# Patient Record
Sex: MALE | Race: WHITE | NOT HISPANIC OR LATINO | URBAN - METROPOLITAN AREA
[De-identification: names, ages, dates, MRNs, and addresses within clinical notes are randomized per-mention and may not be internally consistent; named-entity substitution may affect disease eponyms.]

---

## 2017-01-23 ENCOUNTER — OUTPATIENT (OUTPATIENT)
Dept: OUTPATIENT SERVICES | Facility: HOSPITAL | Age: 52
LOS: 1 days | Discharge: HOME | End: 2017-01-23

## 2017-06-27 DIAGNOSIS — E78.00 PURE HYPERCHOLESTEROLEMIA, UNSPECIFIED: ICD-10-CM

## 2017-06-27 DIAGNOSIS — J02.9 ACUTE PHARYNGITIS, UNSPECIFIED: ICD-10-CM

## 2017-06-27 DIAGNOSIS — G43.909 MIGRAINE, UNSPECIFIED, NOT INTRACTABLE, WITHOUT STATUS MIGRAINOSUS: ICD-10-CM

## 2018-06-22 ENCOUNTER — OUTPATIENT (OUTPATIENT)
Dept: OUTPATIENT SERVICES | Facility: HOSPITAL | Age: 53
LOS: 1 days | Discharge: HOME | End: 2018-06-22

## 2018-06-22 DIAGNOSIS — E78.00 PURE HYPERCHOLESTEROLEMIA, UNSPECIFIED: ICD-10-CM

## 2018-11-06 ENCOUNTER — OUTPATIENT (OUTPATIENT)
Dept: OUTPATIENT SERVICES | Facility: HOSPITAL | Age: 53
LOS: 1 days | Discharge: HOME | End: 2018-11-06

## 2018-11-06 DIAGNOSIS — E04.2 NONTOXIC MULTINODULAR GOITER: ICD-10-CM

## 2018-11-06 DIAGNOSIS — E78.00 PURE HYPERCHOLESTEROLEMIA, UNSPECIFIED: ICD-10-CM

## 2018-11-06 DIAGNOSIS — G43.909 MIGRAINE, UNSPECIFIED, NOT INTRACTABLE, WITHOUT STATUS MIGRAINOSUS: ICD-10-CM

## 2018-11-20 ENCOUNTER — OUTPATIENT (OUTPATIENT)
Dept: OUTPATIENT SERVICES | Facility: HOSPITAL | Age: 53
LOS: 1 days | Discharge: HOME | End: 2018-11-20

## 2018-11-20 DIAGNOSIS — G43.909 MIGRAINE, UNSPECIFIED, NOT INTRACTABLE, WITHOUT STATUS MIGRAINOSUS: ICD-10-CM

## 2018-11-20 DIAGNOSIS — E04.2 NONTOXIC MULTINODULAR GOITER: ICD-10-CM

## 2019-03-12 ENCOUNTER — OUTPATIENT (OUTPATIENT)
Dept: OUTPATIENT SERVICES | Facility: HOSPITAL | Age: 54
LOS: 1 days | Discharge: HOME | End: 2019-03-12

## 2019-03-12 DIAGNOSIS — H61.22 IMPACTED CERUMEN, LEFT EAR: ICD-10-CM

## 2019-03-12 DIAGNOSIS — E55.9 VITAMIN D DEFICIENCY, UNSPECIFIED: ICD-10-CM

## 2019-03-12 DIAGNOSIS — R20.2 PARESTHESIA OF SKIN: ICD-10-CM

## 2019-03-12 DIAGNOSIS — G43.909 MIGRAINE, UNSPECIFIED, NOT INTRACTABLE, WITHOUT STATUS MIGRAINOSUS: ICD-10-CM

## 2019-03-12 DIAGNOSIS — E78.00 PURE HYPERCHOLESTEROLEMIA, UNSPECIFIED: ICD-10-CM

## 2019-08-15 ENCOUNTER — OUTPATIENT (OUTPATIENT)
Dept: OUTPATIENT SERVICES | Facility: HOSPITAL | Age: 54
LOS: 1 days | Discharge: HOME | End: 2019-08-15

## 2019-08-15 DIAGNOSIS — R20.2 PARESTHESIA OF SKIN: ICD-10-CM

## 2019-08-15 DIAGNOSIS — E55.9 VITAMIN D DEFICIENCY, UNSPECIFIED: ICD-10-CM

## 2019-08-15 DIAGNOSIS — E78.00 PURE HYPERCHOLESTEROLEMIA, UNSPECIFIED: ICD-10-CM

## 2019-08-15 DIAGNOSIS — H61.22 IMPACTED CERUMEN, LEFT EAR: ICD-10-CM

## 2019-08-15 DIAGNOSIS — G43.909 MIGRAINE, UNSPECIFIED, NOT INTRACTABLE, WITHOUT STATUS MIGRAINOSUS: ICD-10-CM

## 2019-12-10 ENCOUNTER — OUTPATIENT (OUTPATIENT)
Dept: OUTPATIENT SERVICES | Facility: HOSPITAL | Age: 54
LOS: 1 days | Discharge: HOME | End: 2019-12-10

## 2019-12-10 DIAGNOSIS — E78.00 PURE HYPERCHOLESTEROLEMIA, UNSPECIFIED: ICD-10-CM

## 2019-12-10 DIAGNOSIS — M19.042 PRIMARY OSTEOARTHRITIS, LEFT HAND: ICD-10-CM

## 2019-12-10 DIAGNOSIS — M19.041 PRIMARY OSTEOARTHRITIS, RIGHT HAND: ICD-10-CM

## 2019-12-10 DIAGNOSIS — G43.909 MIGRAINE, UNSPECIFIED, NOT INTRACTABLE, WITHOUT STATUS MIGRAINOSUS: ICD-10-CM

## 2019-12-10 DIAGNOSIS — M19.031 PRIMARY OSTEOARTHRITIS, RIGHT WRIST: ICD-10-CM

## 2019-12-18 PROBLEM — Z00.00 ENCOUNTER FOR PREVENTIVE HEALTH EXAMINATION: Status: ACTIVE | Noted: 2019-12-18

## 2020-01-21 ENCOUNTER — APPOINTMENT (OUTPATIENT)
Dept: PLASTIC SURGERY | Facility: CLINIC | Age: 55
End: 2020-01-21
Payer: COMMERCIAL

## 2020-01-21 VITALS — WEIGHT: 182 LBS | BODY MASS INDEX: 26.05 KG/M2 | HEIGHT: 70 IN

## 2020-01-21 DIAGNOSIS — Z86.39 PERSONAL HISTORY OF OTHER ENDOCRINE, NUTRITIONAL AND METABOLIC DISEASE: ICD-10-CM

## 2020-01-21 DIAGNOSIS — Z78.9 OTHER SPECIFIED HEALTH STATUS: ICD-10-CM

## 2020-01-21 PROCEDURE — 99203 OFFICE O/P NEW LOW 30 MIN: CPT

## 2020-01-21 RX ORDER — SIMVASTATIN 10 MG/1
10 TABLET, FILM COATED ORAL
Refills: 0 | Status: ACTIVE | COMMUNITY

## 2020-01-21 RX ORDER — EUCALYPTUS OIL/MENTHOL/CAMPHOR 1.2%-4.8%
1000 OINTMENT (GRAM) TOPICAL
Refills: 0 | Status: ACTIVE | COMMUNITY

## 2020-01-21 RX ORDER — MENTHOL/CAMPHOR 0.5 %-0.5%
1000 LOTION (ML) TOPICAL
Refills: 0 | Status: ACTIVE | COMMUNITY

## 2020-01-21 RX ORDER — PROPRANOLOL HYDROCHLORIDE 10 MG/1
10 TABLET ORAL
Refills: 0 | Status: ACTIVE | COMMUNITY

## 2020-01-21 NOTE — ASSESSMENT
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

## 2020-01-21 NOTE — HISTORY OF PRESENT ILLNESS
[FreeTextEntry1] : Pt is a 53 y/o M, LHD, with PMH of headaches and HLD who presents for evaluation of BL wrist and thumb pain, R>L, x1 year, getting progressively worse. 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 pushups. Denies paraesthesias, locking/clicking of digits, or pain that wakes him up at night. Denies trauma. Tried wrist braces with mild improvement.\par \par XR 8/19/19: no significant arthritis. Ossicle adjacent to the left ulna styloid process and along the palmar aspect overlying the left 5th metacarpal neck.\par \par Nonsmoker, nondiabetic\par Occ: Drummer

## 2020-01-21 NOTE — PHYSICAL EXAM
[de-identified] : well-appearing, NAD [de-identified] : BL hands and wrists nontender, SILT, FROM, negative grind test, negative Tinels and Phalens, negative Finkelstein

## 2020-01-28 ENCOUNTER — APPOINTMENT (OUTPATIENT)
Dept: UROLOGY | Facility: CLINIC | Age: 55
End: 2020-01-28
Payer: COMMERCIAL

## 2020-01-28 VITALS — HEIGHT: 70 IN | BODY MASS INDEX: 26.05 KG/M2 | WEIGHT: 182 LBS

## 2020-01-28 DIAGNOSIS — N28.1 CYST OF KIDNEY, ACQUIRED: ICD-10-CM

## 2020-01-28 DIAGNOSIS — N20.0 CALCULUS OF KIDNEY: ICD-10-CM

## 2020-01-28 DIAGNOSIS — Z82.49 FAMILY HISTORY OF ISCHEMIC HEART DISEASE AND OTHER DISEASES OF THE CIRCULATORY SYSTEM: ICD-10-CM

## 2020-01-28 PROCEDURE — 99203 OFFICE O/P NEW LOW 30 MIN: CPT

## 2020-01-28 NOTE — HISTORY OF PRESENT ILLNESS
[None] : None [FreeTextEntry1] : BRYANNA CATHERINE is a 54 year old male presents to the office today for stones. He was seen in ER [facility] for right flank pain and CT was done which showed 2 mm calculus in the distal right ureter. In addition, a right 1.7 mm nonobstructing mid pole stone and 2.3 mm left midpole nonobstructing stone seen, and left lower pole cortical cyst 1.5 x 1.6 cm. He was discharged on Flomax and he as able to pass stone on his own 4 days later. Currently voiding well with a good flow, no urinary issues. Pain has resolved since passage of stone and he has brought in stone. Non smoker. Denies family history of prostate cancer.

## 2020-01-28 NOTE — ASSESSMENT
[FreeTextEntry1] : Patient stone will be send out for calculus analysis. He will complete a 24 hour urine test along with calcium, parathyroid, and uric acid. He will also have a PSA done and he will f/u in office to review results.

## 2020-02-04 LAB
CALCIUM SERPL-MCNC: 10.1 MG/DL
CALCIUM SERPL-MCNC: 10.2 MG/DL
NIDUS STONE QN: NORMAL
PARATHYROID HORMONE INTACT: 21 PG/ML
PSA SERPL-MCNC: 1.43 NG/ML
URATE SERPL-MCNC: 6 MG/DL

## 2020-02-11 ENCOUNTER — APPOINTMENT (OUTPATIENT)
Dept: PLASTIC SURGERY | Facility: CLINIC | Age: 55
End: 2020-02-11

## 2020-03-03 ENCOUNTER — APPOINTMENT (OUTPATIENT)
Dept: UROLOGY | Facility: CLINIC | Age: 55
End: 2020-03-03
Payer: COMMERCIAL

## 2020-03-03 PROCEDURE — 99213 OFFICE O/P EST LOW 20 MIN: CPT

## 2020-03-03 NOTE — REVIEW OF SYSTEMS
[Fever] : no fever [Feeling Poorly] : not feeling poorly [Chest Pain] : no chest pain [Shortness Of Breath] : no shortness of breath [Constipation] : no constipation [Diarrhea] : no diarrhea [Dysuria] : no dysuria [Confused] : no confusion

## 2020-03-03 NOTE — ASSESSMENT
[FreeTextEntry1] : Patient instructed to increase water intake to 8-12 8 glasses of water daily and to titrate to make urine clears tap water. Adequate hydration should bring uric acid levels done in the urine to normal however discussed with him low purine diet.  No specific treatment required for nonobstructing stones but can be followed up in the future Radiographically

## 2020-03-03 NOTE — HISTORY OF PRESENT ILLNESS
[FreeTextEntry1] : 54-year-old recently passed a stone. He is asymptomatic tiny non-obstructing stones which are being treated expectantly. He is here to discuss his 24-hour urine collection and blood work. Serum parathyroid hormone, and uric acid are normal. Repeat calcium with a parathyroid hormone was normal also. 24-hour urine shows low urinary volume and minimally elevated uric acid. He has no flank pain

## 2020-03-13 ENCOUNTER — APPOINTMENT (OUTPATIENT)
Dept: PLASTIC SURGERY | Facility: CLINIC | Age: 55
End: 2020-03-13
Payer: COMMERCIAL

## 2020-03-13 DIAGNOSIS — M25.531 PAIN IN RIGHT WRIST: ICD-10-CM

## 2020-03-13 DIAGNOSIS — M25.532 PAIN IN RIGHT WRIST: ICD-10-CM

## 2020-03-13 PROCEDURE — 99212 OFFICE O/P EST SF 10 MIN: CPT

## 2020-03-13 NOTE — PHYSICAL EXAM
[de-identified] : well-appearing, NAD [de-identified] : BL hands and wrists nontender, SILT, FROM, negative grind test, negative Tinels and Phalens, negative Finkelstein

## 2020-03-13 NOTE — ASSESSMENT
[FreeTextEntry1] : 55 y/o M with BL wrist and thumb pain, XR negative, refractory to all conservative mgmt \par XRays Aug 2019 B/L wrist: no arthritis\par \par -No indication for formal PT at this time\par -Continue compressive wrap\par -Will resubmit for MRI: r/o ligamentous injury vs gangion cyst vs other etiology; pretesting needed for possible ligamentous repair vs cyst excision\par -F/u after MRI

## 2020-03-13 NOTE — HISTORY OF PRESENT ILLNESS
[FreeTextEntry1] : Pt is a 55 y/o M, LHD, with PMH of headaches and HLD who presents for evaluation of BL wrist and thumb pain, R>L, x1 year, getting progressively worse. 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 pushups. Denies paraesthesias, locking/clicking of digits, or pain that wakes him up at night. Denies trauma. Tried wrist braces with mild improvement.\par \par XR 8/19/19: no significant arthritis. Ossicle adjacent to the left ulna styloid process and along the palmar aspect overlying the left 5th metacarpal neck.\par \par Nonsmoker, nondiabetic\par Occ: Drummer\par \par Interval hx (3/13/20): Pt presents for follow up reporting persistent pain despite multiple conservative intervention. Has tried ice/cool compresses, warm compresses, home physical therapy exercises, two different types of compression wraps, and multiple NSAIDS including Naprosyn, Advil, and Aleve. 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 Also having difficulty caring for his mother who has severe Alzheimer's and for whom he is the primary caretaker.

## 2020-08-31 ENCOUNTER — TRANSCRIPTION ENCOUNTER (OUTPATIENT)
Age: 55
End: 2020-08-31

## 2021-02-26 ENCOUNTER — APPOINTMENT (OUTPATIENT)
Dept: UROLOGY | Facility: CLINIC | Age: 56
End: 2021-02-26

## 2021-03-30 ENCOUNTER — APPOINTMENT (OUTPATIENT)
Dept: UROLOGY | Facility: CLINIC | Age: 56
End: 2021-03-30
Payer: COMMERCIAL

## 2021-03-30 DIAGNOSIS — Z12.5 ENCOUNTER FOR SCREENING FOR MALIGNANT NEOPLASM OF PROSTATE: ICD-10-CM

## 2021-03-30 PROCEDURE — 99072 ADDL SUPL MATRL&STAF TM PHE: CPT

## 2021-03-30 PROCEDURE — 99214 OFFICE O/P EST MOD 30 MIN: CPT

## 2021-03-30 RX ORDER — TADALAFIL 5 MG/1
5 TABLET ORAL
Qty: 6 | Refills: 3 | Status: ACTIVE | COMMUNITY
Start: 2021-03-30 | End: 1900-01-01

## 2021-03-30 NOTE — PHYSICAL EXAM
[Normal Appearance] : normal appearance [Well Groomed] : well groomed [General Appearance - In No Acute Distress] : no acute distress [Skin Color & Pigmentation] : normal skin color and pigmentation [Edema] : no peripheral edema [] : no respiratory distress [Oriented To Time, Place, And Person] : oriented to person, place, and time [Normal Station and Gait] : the gait and station were normal for the patient's age

## 2021-03-30 NOTE — HISTORY OF PRESENT ILLNESS
[FreeTextEntry1] : Patient is a 55 year old male presenting with blood work with Testosterone blood labs. Patient reports that he takes Propanol every day for headache prophylaxis. Patient states he has been experiencing sexual side effects. Patient went to primary and had blood drawn for Testosterone. Patients primary gave the patient Sildenafil 50 mg but patient takes 25 mg with good erections. Patient does report headaches when he takes the medication. \par \par Patient reports good libido. Patient denies low energy. Patient does report recent weight gain over the last year. \par \par Testosterone T- 227.1\par Testosterone Free- 6.8 \par \par As for Kidney Stones, Patient reports no symptoms over the last year. Patient states that he continues to drink plenty of water.

## 2021-03-30 NOTE — REVIEW OF SYSTEMS
[see HPI] : see HPI [Erectile Dysfunction] : erectile dysfunction [Fever] : no fever [Chills] : no chills [Sore Throat] : no sore throat [Chest Pain] : no chest pain [Shortness Of Breath] : no shortness of breath [Cough] : no cough [Abdominal Pain] : no abdominal pain [Vomiting] : no vomiting [Constipation] : no constipation [Diarrhea] : no diarrhea [Muscle Weakness] : no muscle weakness

## 2021-03-30 NOTE — ASSESSMENT
[FreeTextEntry1] : Patient is 55 year old male with Erectile Dysfunction. \par Lab work shows Low testosterone; however patient has no symptoms of low T besides Erectile Dysfunction and patient has good response to Sildenafil 25 mg. Risks benefits discussed regarding testosterone replacement.  Patient states he gets headaches when taking Sildenafil 25 mg. \par \par Plan\par -Switch to Tadalafil 5 mg 1 hour before needed. \par -Continue adequate hydration for stone prevention\par -PSA\par -follow up 1 year

## 2021-03-31 ENCOUNTER — TRANSCRIPTION ENCOUNTER (OUTPATIENT)
Age: 56
End: 2021-03-31

## 2022-04-01 ENCOUNTER — APPOINTMENT (OUTPATIENT)
Dept: UROLOGY | Facility: CLINIC | Age: 57
End: 2022-04-01

## 2022-07-21 ENCOUNTER — APPOINTMENT (OUTPATIENT)
Dept: NEUROLOGY | Facility: CLINIC | Age: 57
End: 2022-07-21

## 2022-07-21 VITALS
BODY MASS INDEX: 25.77 KG/M2 | SYSTOLIC BLOOD PRESSURE: 131 MMHG | HEIGHT: 70 IN | WEIGHT: 180 LBS | HEART RATE: 76 BPM | DIASTOLIC BLOOD PRESSURE: 82 MMHG

## 2022-07-21 PROCEDURE — 99213 OFFICE O/P EST LOW 20 MIN: CPT

## 2022-07-21 NOTE — PHYSICAL EXAM
[General Appearance - Alert] : alert [General Appearance - In No Acute Distress] : in no acute distress [Oriented To Time, Place, And Person] : oriented to person, place, and time [Affect] : the affect was normal [Mood] : the mood was normal [Memory Recent] : recent memory was not impaired [Memory Remote] : remote memory was not impaired [Person] : oriented to person [Place] : oriented to place [Time] : oriented to time [Short Term Intact] : short term memory intact [Remote Intact] : remote memory intact [Registration Intact] : recent registration memory intact [Cranial Nerves Optic (II)] : visual acuity intact bilaterally,  visual fields full to confrontation, pupils equal round and reactive to light [Cranial Nerves Oculomotor (III)] : extraocular motion intact [Cranial Nerves Facial (VII)] : face symmetrical [Motor Tone] : muscle tone was normal in all four extremities [Motor Strength] : muscle strength was normal in all four extremities [Involuntary Movements] : no involuntary movements were seen

## 2022-07-21 NOTE — ASSESSMENT
[FreeTextEntry1] : 57 year old male with chronic migraines. We will continue to prescribe Propranolol  mg daily and f/u in 6 months for re evaluation. He is aware if he has any questions he can contact the office.\par \par I personally reviewed with the PA, this patient's history and physical exam findings, as documented above. I have discussed the relevant areas of concern, having direct implications to the presenting problems and illnesses, and I have personally examined all pertinent and positive and negative findings, which impact on the prior neurological treatment. \par \par \par Wendy Chapa, MS, PA-C\par Joe Huerta MD\par \par \par

## 2022-07-21 NOTE — HISTORY OF PRESENT ILLNESS
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 He had two CAT scans, which I reviewed, which just showed sinus disease, nothing intracranially. He had a deviated septum, which was operated, but it did not improve his headaches. He has recently been given Fioricet, which does help the headaches, but because of the phenobarbital it makes him what he calls loopy, he does not like to take it. The patient also has a history of allergies. He takes Allegra-D. He does not drink and does not smoke. Family history, his father had heart disease and diabetes. In the mother's family, there is cancer. As far as his headaches are concerned, they are usually bifrontal, but they can also be occipital. They are daily and no nausea or vomiting or photophobia.\par \par TODAY: I had the pleasure of speaking with Mr. Munguia today over the phone. His previous history and physical findings have been reviewed. \par \par He is under our care for migraines which is a chronic condition he is receiving continuing active treatment for.  He continues to remain stable on a regimen of propranolol 120 mg daily stating he continues to have no migraines whatsoever. He states he may experience 1 headache a month which is alleviated with Excedrin. He is able to function and have quality of life and we will therefore continue as is without change and he is more than agreeable to the plan at this time .

## 2022-07-21 NOTE — REVIEW OF SYSTEMS
[Migraine Headache] : migraine headaches [Tension Headache] : tension-type headaches [Negative] : Respiratory

## 2023-03-02 ENCOUNTER — APPOINTMENT (OUTPATIENT)
Dept: NEUROLOGY | Facility: CLINIC | Age: 58
End: 2023-03-02
Payer: COMMERCIAL

## 2023-03-02 VITALS
SYSTOLIC BLOOD PRESSURE: 131 MMHG | WEIGHT: 180 LBS | BODY MASS INDEX: 25.77 KG/M2 | DIASTOLIC BLOOD PRESSURE: 81 MMHG | HEART RATE: 51 BPM | HEIGHT: 70 IN

## 2023-03-02 DIAGNOSIS — G44.211 EPISODIC TENSION-TYPE HEADACHE, INTRACTABLE: ICD-10-CM

## 2023-03-02 PROCEDURE — 99213 OFFICE O/P EST LOW 20 MIN: CPT

## 2023-03-02 NOTE — HISTORY OF PRESENT ILLNESS
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 He had two CAT scans, which I reviewed, which just showed sinus disease, nothing intracranially. He had a deviated septum, which was operated, but it did not improve his headaches. He has recently been given Fioricet, which does help the headaches, but because of the phenobarbital it makes him what he calls loopy, he does not like to take it. The patient also has a history of allergies. He takes Allegra-D. He does not drink and does not smoke. Family history, his father had heart disease and diabetes. In the mother's family, there is cancer. As far as his headaches are concerned, they are usually bifrontal, but they can also be occipital. They are daily and no nausea or vomiting or photophobia.\par \par TODAY: I had the pleasure of speaking with Mr. Munguia today over the phone. His previous history and physical findings have been reviewed. \par \par He is under our care for migraines which is a chronic condition he is receiving continuing active treatment for.  He states nothing has changed over the past several months with respect to his condition.  He continues to remain stable on a regimen of propranolol 120 mg daily stating he will experience at most 3 migraines  per month.  He states when they do occur they are alleviated with Advil. He is able to function and have quality of life and we will therefore continue as is without change and he is more than agreeable to the plan at this time .

## 2023-09-28 ENCOUNTER — APPOINTMENT (OUTPATIENT)
Dept: NEUROLOGY | Facility: CLINIC | Age: 58
End: 2023-09-28
Payer: COMMERCIAL

## 2023-09-28 DIAGNOSIS — G43.809 OTHER MIGRAINE, NOT INTRACTABLE, W/OUT STATUS MIGRAINOSUS: ICD-10-CM

## 2023-09-28 PROCEDURE — 99213 OFFICE O/P EST LOW 20 MIN: CPT

## 2024-01-25 ENCOUNTER — LABORATORY RESULT (OUTPATIENT)
Age: 59
End: 2024-01-25

## 2024-01-26 ENCOUNTER — APPOINTMENT (OUTPATIENT)
Dept: UROLOGY | Facility: CLINIC | Age: 59
End: 2024-01-26
Payer: COMMERCIAL

## 2024-01-26 VITALS
BODY MASS INDEX: 24.91 KG/M2 | WEIGHT: 174 LBS | SYSTOLIC BLOOD PRESSURE: 123 MMHG | HEART RATE: 43 BPM | DIASTOLIC BLOOD PRESSURE: 74 MMHG | TEMPERATURE: 97.1 F | HEIGHT: 70 IN

## 2024-01-26 DIAGNOSIS — N13.8 BENIGN PROSTATIC HYPERPLASIA WITH LOWER URINARY TRACT SYMPMS: ICD-10-CM

## 2024-01-26 DIAGNOSIS — N40.1 BENIGN PROSTATIC HYPERPLASIA WITH LOWER URINARY TRACT SYMPMS: ICD-10-CM

## 2024-01-26 DIAGNOSIS — R97.20 ELEVATED PROSTATE, SPECIFIC ANTIGEN [PSA]: ICD-10-CM

## 2024-01-26 LAB
BILIRUB UR QL STRIP: NORMAL
COLLECTION METHOD: NORMAL
GLUCOSE UR-MCNC: NORMAL
HCG UR QL: 0.2 EU/DL
HGB UR QL STRIP.AUTO: NORMAL
KETONES UR-MCNC: NORMAL
LEUKOCYTE ESTERASE UR QL STRIP: NORMAL
NITRITE UR QL STRIP: POSITIVE
PH UR STRIP: 6
PROT UR STRIP-MCNC: NORMAL
SP GR UR STRIP: 1.02

## 2024-01-26 PROCEDURE — 99214 OFFICE O/P EST MOD 30 MIN: CPT | Mod: 25

## 2024-01-26 NOTE — END OF VISIT
[FreeTextEntry3] : I obtained history/physical, formulated treatment plan which I discussed with the patient agree with the above transcription by the physicians assistant

## 2024-01-26 NOTE — ASSESSMENT
[FreeTextEntry1] : 1.left renal colic- h/o nephrolithiasis- ?? passing or passed stone 2.elevated PSA velocity 3.ED on sildenafil 50mg  Plan: -stone protocol ctscan -repeat PSA- if remains elevated for age will proceed with a prostate MRI -will f/u on urine cx and treat accordingly -advise advil/motrin or tylenol before taking sildenafil to see if it will help with his HA -rto 4 weeks

## 2024-01-26 NOTE — HISTORY OF PRESENT ILLNESS
[FreeTextEntry1] : 58-year-old male with h/o nephrolithiasis and ED- last seen in our office in 2021 referred back by his pcp for an elevated PSA velocity patient reports voiding well he reports having left flank pain beginning on Wednesday- he took Motrin with relief on and off of pain- he currently has been pain free since yesterday He denies dysuria, hematuria, fevers, N/V He also uses sildenafil 50mg for ED with good results but does get a headache from the med   UA- 1/25/24-  trace leuk, +nitrite urine cx - pending  PSA- 10/2023-  2.6               2022-  1.1  (per report from pcp)  abd/pelvic ctscan 2020-   1.7mm right renal stone, 2mm right ureteral stone, 2.3mm left renal stone

## 2024-01-29 LAB — BACTERIA UR CULT: NORMAL

## 2024-01-31 LAB
PSA FREE FLD-MCNC: 30 %
PSA FREE SERPL-MCNC: 0.47 NG/ML
PSA SERPL-MCNC: 1.59 NG/ML

## 2024-02-11 ENCOUNTER — RESULT REVIEW (OUTPATIENT)
Age: 59
End: 2024-02-11

## 2024-02-11 ENCOUNTER — OUTPATIENT (OUTPATIENT)
Dept: OUTPATIENT SERVICES | Facility: HOSPITAL | Age: 59
LOS: 1 days | End: 2024-02-11
Payer: COMMERCIAL

## 2024-02-11 DIAGNOSIS — Z00.8 ENCOUNTER FOR OTHER GENERAL EXAMINATION: ICD-10-CM

## 2024-02-11 DIAGNOSIS — R97.20 ELEVATED PROSTATE SPECIFIC ANTIGEN [PSA]: ICD-10-CM

## 2024-02-11 PROCEDURE — 74176 CT ABD & PELVIS W/O CONTRAST: CPT

## 2024-02-11 PROCEDURE — 74176 CT ABD & PELVIS W/O CONTRAST: CPT | Mod: 26

## 2024-02-12 DIAGNOSIS — R97.20 ELEVATED PROSTATE SPECIFIC ANTIGEN [PSA]: ICD-10-CM

## 2024-03-08 ENCOUNTER — RX RENEWAL (OUTPATIENT)
Age: 59
End: 2024-03-08

## 2024-03-22 ENCOUNTER — APPOINTMENT (OUTPATIENT)
Dept: UROLOGY | Facility: CLINIC | Age: 59
End: 2024-03-22
Payer: COMMERCIAL

## 2024-03-22 VITALS
SYSTOLIC BLOOD PRESSURE: 119 MMHG | HEIGHT: 70 IN | DIASTOLIC BLOOD PRESSURE: 76 MMHG | HEART RATE: 44 BPM | RESPIRATION RATE: 18 BRPM | WEIGHT: 174 LBS | OXYGEN SATURATION: 99 % | BODY MASS INDEX: 24.91 KG/M2

## 2024-03-22 DIAGNOSIS — N20.0 CALCULUS OF KIDNEY: ICD-10-CM

## 2024-03-22 DIAGNOSIS — N52.9 MALE ERECTILE DYSFUNCTION, UNSPECIFIED: ICD-10-CM

## 2024-03-22 LAB
BILIRUB UR QL STRIP: NORMAL
CLARITY UR: CLEAR
COLLECTION METHOD: NORMAL
GLUCOSE UR-MCNC: NORMAL
HCG UR QL: 0.2 EU/DL
HGB UR QL STRIP.AUTO: NORMAL
KETONES UR-MCNC: NORMAL
LEUKOCYTE ESTERASE UR QL STRIP: NORMAL
NITRITE UR QL STRIP: NORMAL
PH UR STRIP: 6
PROT UR STRIP-MCNC: NORMAL
SP GR UR STRIP: >=1.03

## 2024-03-22 PROCEDURE — 99214 OFFICE O/P EST MOD 30 MIN: CPT | Mod: 25

## 2024-03-22 NOTE — ASSESSMENT
[FreeTextEntry1] : 1.left renal colic- resolved-?? passed stone 2.elevated PSA velocity- now normalized 3.ED on sildenafil 50mg 4. bilateral punctate stones.   Plan: -annual PSA -rediscussed increasing liquid intake as recommended after metabolic workup in 2021 -rto 12 months

## 2024-03-22 NOTE — HISTORY OF PRESENT ILLNESS
[FreeTextEntry1] : 58-year-old male with h/o nephrolithiasis and ED- last seen in our office in 2021 referred back by his pcp for an elevated PSA velocity -aoorix 1 month ago at the last visit he reported having left flank pain beginning a few days prior- he took Motrin with relief on and off of pain-  currently he is pain free - he did not see any stones pass He denies dysuria, hematuria, fevers, N/V He also uses sildenafil 50mg for ED with good results but does get a headache from the med   UA- 1/25/24-  trace leuk, +nitrite urine cx - no growth UA- 2 RBC  PSA-   1/2024-  1.5          10/2023-  2.6               2022-  1.1  (per report from pcp)  abd/pelvic ctscan- 2/11/2024  KIDNEYS: 2 punctate bilateral nonobstructing renal calculi identified. Left lower pole renal cyst. No hydroureteronephrosis.  abd/pelvic ctscan 2020-   1.7mm right renal stone, 2mm right ureteral stone, 2.3mm left renal stone  serum parathyroid hormone, and uric acid are normal.  Calcium with a parathyroid hormone was normal  24-hour urine shows low urinary volume and minimally elevated uric acid  stone analysis- 90% uric acid, 10% Ca+

## 2024-03-28 ENCOUNTER — APPOINTMENT (OUTPATIENT)
Dept: NEUROLOGY | Facility: CLINIC | Age: 59
End: 2024-03-28

## 2024-05-14 RX ORDER — PROPRANOLOL HYDROCHLORIDE 120 MG/1
120 CAPSULE, EXTENDED RELEASE ORAL
Qty: 90 | Refills: 3 | Status: ACTIVE | COMMUNITY
Start: 2024-05-10

## 2024-05-14 RX ORDER — PROPRANOLOL HYDROCHLORIDE 120 MG/1
120 CAPSULE, EXTENDED RELEASE ORAL
Qty: 90 | Refills: 3 | Status: ACTIVE | COMMUNITY
Start: 2024-05-14 | End: 1900-01-01

## 2024-06-05 ENCOUNTER — APPOINTMENT (OUTPATIENT)
Dept: NEUROLOGY | Facility: CLINIC | Age: 59
End: 2024-06-05
Payer: COMMERCIAL

## 2024-06-05 VITALS
BODY MASS INDEX: 24.91 KG/M2 | WEIGHT: 174 LBS | SYSTOLIC BLOOD PRESSURE: 111 MMHG | HEART RATE: 43 BPM | HEIGHT: 70 IN | DIASTOLIC BLOOD PRESSURE: 67 MMHG

## 2024-06-05 DIAGNOSIS — Z87.898 PERSONAL HISTORY OF OTHER SPECIFIED CONDITIONS: ICD-10-CM

## 2024-06-05 PROCEDURE — 99213 OFFICE O/P EST LOW 20 MIN: CPT

## 2024-06-05 RX ORDER — PROPRANOLOL HYDROCHLORIDE 120 MG/1
120 CAPSULE, EXTENDED RELEASE ORAL
Qty: 90 | Refills: 1 | Status: ACTIVE | COMMUNITY
Start: 2022-07-14 | End: 1900-01-01

## 2024-06-05 NOTE — HISTORY OF PRESENT ILLNESS
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 He had two CAT scans, which I reviewed, which just showed sinus disease, nothing intracranially. He had a deviated septum, which was operated, but it did not improve his headaches. He has recently been given Fioricet, which does help the headaches, but because of the phenobarbital it makes him what he calls loopy, he does not like to take it. The patient also has a history of allergies. He takes Allegra-D. He does not drink and does not smoke. Family history, his father had heart disease and diabetes. In the mother's family, there is cancer. As far as his headaches are concerned, they are usually bifrontal, but they can also be occipital. They are daily and no nausea or vomiting or photophobia.  TODAY: I had the pleasure of speaking with Mr. Munguia today over the phone. His previous history and physical findings have been reviewed.   Mr. Munguia remains under our care for chronic migraines. This is a chronic condition that he is receiving continuous active treatment for.  Patient condition continues to remain stable on a regimen of propranolol 120 mg daily stating he will experience at most 3 headache days per month which are alleviated with Advil when they occur. He is able to function without side effects and have quality of life and we will therefore continue as is without change and he is more than agreeable to the plan at this time . Today patient denies any new or progressive symptoms.

## 2024-06-05 NOTE — PHYSICAL EXAM
[General Appearance - Alert] : alert [General Appearance - In No Acute Distress] : in no acute distress [Oriented To Time, Place, And Person] : oriented to person, place, and time [Affect] : the affect was normal [Mood] : the mood was normal [Memory Recent] : recent memory was not impaired [Memory Remote] : remote memory was not impaired [Person] : oriented to person [Place] : oriented to place [Time] : oriented to time [Short Term Intact] : short term memory intact [Remote Intact] : remote memory intact [Registration Intact] : recent registration memory intact [Cranial Nerves Optic (II)] : visual acuity intact bilaterally,  visual fields full to confrontation, pupils equal round and reactive to light [Cranial Nerves Facial (VII)] : face symmetrical [Cranial Nerves Oculomotor (III)] : extraocular motion intact [Motor Tone] : muscle tone was normal in all four extremities [Motor Strength] : muscle strength was normal in all four extremities [Involuntary Movements] : no involuntary movements were seen

## 2024-06-05 NOTE — ASSESSMENT
[FreeTextEntry1] : 59 year old male with chronic migraines. We will continue to prescribe Propranolol  mg daily as is without change and he will return to the office for f/u in 6 months for re evaluation. He is aware if he has any questions he can contact the office.  Supervising Physician : Joe Huerta MD

## 2024-11-11 ENCOUNTER — OUTPATIENT (OUTPATIENT)
Dept: OUTPATIENT SERVICES | Facility: HOSPITAL | Age: 59
LOS: 1 days | End: 2024-11-11
Payer: COMMERCIAL

## 2024-11-11 ENCOUNTER — OUTPATIENT (OUTPATIENT)
Dept: OUTPATIENT SERVICES | Facility: HOSPITAL | Age: 59
LOS: 1 days | End: 2024-11-11

## 2024-11-11 ENCOUNTER — APPOINTMENT (OUTPATIENT)
Dept: SPEECH THERAPY | Facility: CLINIC | Age: 59
End: 2024-11-11

## 2024-11-11 DIAGNOSIS — H90.3 SENSORINEURAL HEARING LOSS, BILATERAL: ICD-10-CM

## 2024-11-11 PROCEDURE — 92550 TYMPANOMETRY & REFLEX THRESH: CPT

## 2024-11-11 PROCEDURE — 92557 COMPREHENSIVE HEARING TEST: CPT

## 2024-12-02 ENCOUNTER — OUTPATIENT (OUTPATIENT)
Dept: OUTPATIENT SERVICES | Facility: HOSPITAL | Age: 59
LOS: 1 days | End: 2024-12-02
Payer: COMMERCIAL

## 2024-12-02 ENCOUNTER — APPOINTMENT (OUTPATIENT)
Dept: SPEECH THERAPY | Facility: CLINIC | Age: 59
End: 2024-12-02

## 2024-12-02 DIAGNOSIS — H90.3 SENSORINEURAL HEARING LOSS, BILATERAL: ICD-10-CM

## 2024-12-02 PROCEDURE — V5261: CPT

## 2024-12-02 PROCEDURE — V5160: CPT

## 2024-12-19 ENCOUNTER — APPOINTMENT (OUTPATIENT)
Dept: SPEECH THERAPY | Facility: CLINIC | Age: 59
End: 2024-12-19

## 2024-12-19 ENCOUNTER — OUTPATIENT (OUTPATIENT)
Dept: OUTPATIENT SERVICES | Facility: HOSPITAL | Age: 59
LOS: 1 days | End: 2024-12-19

## 2024-12-19 DIAGNOSIS — H90.3 SENSORINEURAL HEARING LOSS, BILATERAL: ICD-10-CM

## 2025-02-28 ENCOUNTER — APPOINTMENT (OUTPATIENT)
Dept: NEUROLOGY | Facility: CLINIC | Age: 60
End: 2025-02-28
Payer: COMMERCIAL

## 2025-02-28 VITALS
HEART RATE: 43 BPM | SYSTOLIC BLOOD PRESSURE: 124 MMHG | BODY MASS INDEX: 24.91 KG/M2 | WEIGHT: 174 LBS | HEIGHT: 70 IN | DIASTOLIC BLOOD PRESSURE: 75 MMHG

## 2025-02-28 DIAGNOSIS — G43.809 OTHER MIGRAINE, NOT INTRACTABLE, W/OUT STATUS MIGRAINOSUS: ICD-10-CM

## 2025-02-28 PROCEDURE — 99213 OFFICE O/P EST LOW 20 MIN: CPT

## 2025-03-25 ENCOUNTER — APPOINTMENT (OUTPATIENT)
Dept: UROLOGY | Facility: CLINIC | Age: 60
End: 2025-03-25

## 2025-08-25 ENCOUNTER — APPOINTMENT (OUTPATIENT)
Dept: NEUROLOGY | Facility: CLINIC | Age: 60
End: 2025-08-25